# Patient Record
Sex: MALE | Race: WHITE | Employment: FULL TIME | URBAN - METROPOLITAN AREA
[De-identification: names, ages, dates, MRNs, and addresses within clinical notes are randomized per-mention and may not be internally consistent; named-entity substitution may affect disease eponyms.]

---

## 2017-03-23 DIAGNOSIS — N40.1 BENIGN NON-NODULAR PROSTATIC HYPERPLASIA WITH LOWER URINARY TRACT SYMPTOMS: ICD-10-CM

## 2017-03-24 RX ORDER — ALFUZOSIN HYDROCHLORIDE 10 MG/1
TABLET, EXTENDED RELEASE ORAL
Qty: 90 TABLET | Refills: 0 | Status: SHIPPED | OUTPATIENT
Start: 2017-03-24 | End: 2019-12-10 | Stop reason: SDUPTHER

## 2017-05-17 ENCOUNTER — TELEPHONE (OUTPATIENT)
Dept: UROLOGY | Age: 57
End: 2017-05-17

## 2017-05-23 ENCOUNTER — OFFICE VISIT (OUTPATIENT)
Dept: UROLOGY | Age: 57
End: 2017-05-23
Payer: COMMERCIAL

## 2017-05-23 VITALS
HEIGHT: 65 IN | SYSTOLIC BLOOD PRESSURE: 130 MMHG | TEMPERATURE: 98.2 F | BODY MASS INDEX: 26.26 KG/M2 | HEART RATE: 70 BPM | DIASTOLIC BLOOD PRESSURE: 85 MMHG | WEIGHT: 157.6 LBS

## 2017-05-23 DIAGNOSIS — N40.1 BPH WITH OBSTRUCTION/LOWER URINARY TRACT SYMPTOMS: Primary | ICD-10-CM

## 2017-05-23 DIAGNOSIS — N13.8 BPH WITH OBSTRUCTION/LOWER URINARY TRACT SYMPTOMS: Primary | ICD-10-CM

## 2017-05-23 DIAGNOSIS — R35.1 NOCTURIA: ICD-10-CM

## 2017-05-23 DIAGNOSIS — Z12.5 SPECIAL SCREENING FOR MALIGNANT NEOPLASM OF PROSTATE: ICD-10-CM

## 2017-05-23 PROCEDURE — 99214 OFFICE O/P EST MOD 30 MIN: CPT | Performed by: UROLOGY

## 2017-05-23 RX ORDER — ALFUZOSIN HYDROCHLORIDE 10 MG/1
10 TABLET, EXTENDED RELEASE ORAL DAILY
Qty: 90 TABLET | Refills: 3 | Status: SHIPPED | OUTPATIENT
Start: 2017-05-23 | End: 2018-05-11 | Stop reason: SDUPTHER

## 2017-05-23 RX ORDER — CLOTRIMAZOLE 1 %
CREAM (GRAM) TOPICAL
COMMUNITY
Start: 2017-05-08 | End: 2018-05-11 | Stop reason: ALTCHOICE

## 2017-05-23 ASSESSMENT — ENCOUNTER SYMPTOMS
NAUSEA: 0
EYE PAIN: 0
COUGH: 0
EYE REDNESS: 0
ABDOMINAL PAIN: 0
VOMITING: 0
BACK PAIN: 1
SHORTNESS OF BREATH: 0
WHEEZING: 0
COLOR CHANGE: 0

## 2018-05-09 DIAGNOSIS — Z12.5 SPECIAL SCREENING FOR MALIGNANT NEOPLASM OF PROSTATE: ICD-10-CM

## 2018-05-11 ENCOUNTER — OFFICE VISIT (OUTPATIENT)
Dept: UROLOGY | Age: 58
End: 2018-05-11
Payer: COMMERCIAL

## 2018-05-11 VITALS
DIASTOLIC BLOOD PRESSURE: 89 MMHG | WEIGHT: 158.6 LBS | BODY MASS INDEX: 26.42 KG/M2 | HEART RATE: 70 BPM | HEIGHT: 65 IN | TEMPERATURE: 97.8 F | SYSTOLIC BLOOD PRESSURE: 150 MMHG

## 2018-05-11 DIAGNOSIS — N13.8 BPH WITH OBSTRUCTION/LOWER URINARY TRACT SYMPTOMS: ICD-10-CM

## 2018-05-11 DIAGNOSIS — N40.1 BPH WITH OBSTRUCTION/LOWER URINARY TRACT SYMPTOMS: ICD-10-CM

## 2018-05-11 DIAGNOSIS — R35.1 NOCTURIA: Primary | ICD-10-CM

## 2018-05-11 PROCEDURE — 99213 OFFICE O/P EST LOW 20 MIN: CPT | Performed by: UROLOGY

## 2018-05-11 RX ORDER — ASPIRIN 81 MG/1
81 TABLET, CHEWABLE ORAL
COMMUNITY

## 2018-05-11 RX ORDER — FLUTICASONE PROPIONATE 50 MCG
SPRAY, SUSPENSION (ML) NASAL
Refills: 0 | COMMUNITY
Start: 2018-02-21 | End: 2019-12-10 | Stop reason: ALTCHOICE

## 2018-05-11 RX ORDER — ALCOHOL 62 ML/100ML
LIQUID TOPICAL
Refills: 0 | COMMUNITY
Start: 2018-05-01

## 2018-05-11 ASSESSMENT — ENCOUNTER SYMPTOMS
EYE PAIN: 0
SHORTNESS OF BREATH: 0
COLOR CHANGE: 0
BACK PAIN: 0
WHEEZING: 0
VOMITING: 0
NAUSEA: 0
ABDOMINAL PAIN: 0
EYE REDNESS: 0
COUGH: 0

## 2019-11-26 ENCOUNTER — TELEPHONE (OUTPATIENT)
Dept: UROLOGY | Age: 59
End: 2019-11-26

## 2019-12-10 ENCOUNTER — OFFICE VISIT (OUTPATIENT)
Dept: UROLOGY | Age: 59
End: 2019-12-10
Payer: COMMERCIAL

## 2019-12-10 VITALS
DIASTOLIC BLOOD PRESSURE: 86 MMHG | HEART RATE: 64 BPM | WEIGHT: 155.8 LBS | HEIGHT: 65 IN | BODY MASS INDEX: 25.96 KG/M2 | SYSTOLIC BLOOD PRESSURE: 132 MMHG

## 2019-12-10 DIAGNOSIS — N40.1 BENIGN NON-NODULAR PROSTATIC HYPERPLASIA WITH LOWER URINARY TRACT SYMPTOMS: Primary | ICD-10-CM

## 2019-12-10 DIAGNOSIS — Z12.5 PROSTATE CANCER SCREENING: ICD-10-CM

## 2019-12-10 PROCEDURE — 99213 OFFICE O/P EST LOW 20 MIN: CPT | Performed by: UROLOGY

## 2019-12-10 RX ORDER — ALFUZOSIN HYDROCHLORIDE 10 MG/1
10 TABLET, EXTENDED RELEASE ORAL DAILY
Qty: 90 TABLET | Refills: 3 | Status: SHIPPED | OUTPATIENT
Start: 2019-12-10 | End: 2022-03-29 | Stop reason: SDUPTHER

## 2019-12-10 ASSESSMENT — ENCOUNTER SYMPTOMS
BACK PAIN: 0
DIARRHEA: 0
COUGH: 0
VOMITING: 0
ABDOMINAL PAIN: 0
SHORTNESS OF BREATH: 0
WHEEZING: 0
CONSTIPATION: 0
EYE REDNESS: 0
EYE PAIN: 0
NAUSEA: 0

## 2021-02-18 ENCOUNTER — TELEPHONE (OUTPATIENT)
Dept: UROLOGY | Age: 61
End: 2021-02-18

## 2021-02-18 DIAGNOSIS — Z12.5 SCREENING FOR PROSTATE CANCER: Primary | ICD-10-CM

## 2021-03-18 ENCOUNTER — HOSPITAL ENCOUNTER (OUTPATIENT)
Dept: CARDIAC CATH/INVASIVE PROCEDURES | Age: 61
Discharge: HOME | End: 2021-03-18
Payer: COMMERCIAL

## 2021-03-18 VITALS
TEMPERATURE: 98.2 F | OXYGEN SATURATION: 98 % | HEART RATE: 77 BPM | HEIGHT: 66 IN | BODY MASS INDEX: 25.39 KG/M2 | SYSTOLIC BLOOD PRESSURE: 145 MMHG | RESPIRATION RATE: 17 BRPM | WEIGHT: 158 LBS | DIASTOLIC BLOOD PRESSURE: 77 MMHG

## 2021-03-18 LAB
GFR NON-AFRICAN AMERICAN: 56 ML/MIN
GFR SERPL CREATININE-BSD FRML MDRD: >60 ML/MIN
GFR SERPL CREATININE-BSD FRML MDRD: ABNORMAL ML/MIN/{1.73_M2}
GLUCOSE BLD-MCNC: 100 MG/DL (ref 74–100)
POC CHLORIDE: 106 MMOL/L (ref 98–107)
POC CREATININE: 1.29 MG/DL (ref 0.51–1.19)
POC HEMATOCRIT: 45 % (ref 41–53)
POC HEMOGLOBIN: 15.3 G/DL (ref 13.5–17.5)
POC POTASSIUM: 4.6 MMOL/L (ref 3.5–4.5)
POC SODIUM: 138 MMOL/L (ref 138–146)

## 2021-03-18 PROCEDURE — 82947 ASSAY GLUCOSE BLOOD QUANT: CPT

## 2021-03-18 PROCEDURE — 6360000004 HC RX CONTRAST MEDICATION

## 2021-03-18 PROCEDURE — C1725 CATH, TRANSLUMIN NON-LASER: HCPCS

## 2021-03-18 PROCEDURE — 84295 ASSAY OF SERUM SODIUM: CPT

## 2021-03-18 PROCEDURE — 82435 ASSAY OF BLOOD CHLORIDE: CPT

## 2021-03-18 PROCEDURE — C1876 STENT, NON-COA/NON-COV W/DEL: HCPCS

## 2021-03-18 PROCEDURE — 37221 HC ILIAC TERRITORY PLASTY STENT: CPT | Performed by: SURGERY

## 2021-03-18 PROCEDURE — 6360000002 HC RX W HCPCS

## 2021-03-18 PROCEDURE — 7100000000 HC PACU RECOVERY - FIRST 15 MIN

## 2021-03-18 PROCEDURE — 85014 HEMATOCRIT: CPT

## 2021-03-18 PROCEDURE — C1887 CATHETER, GUIDING: HCPCS

## 2021-03-18 PROCEDURE — 82565 ASSAY OF CREATININE: CPT

## 2021-03-18 PROCEDURE — 7100000001 HC PACU RECOVERY - ADDTL 15 MIN

## 2021-03-18 PROCEDURE — 75625 CONTRAST EXAM ABDOMINL AORTA: CPT | Performed by: SURGERY

## 2021-03-18 PROCEDURE — C1769 GUIDE WIRE: HCPCS

## 2021-03-18 PROCEDURE — C1760 CLOSURE DEV, VASC: HCPCS

## 2021-03-18 PROCEDURE — 2500000003 HC RX 250 WO HCPCS

## 2021-03-18 PROCEDURE — C1894 INTRO/SHEATH, NON-LASER: HCPCS

## 2021-03-18 PROCEDURE — 84132 ASSAY OF SERUM POTASSIUM: CPT

## 2021-03-18 PROCEDURE — 2709999900 HC NON-CHARGEABLE SUPPLY

## 2021-03-18 RX ORDER — SODIUM CHLORIDE 9 MG/ML
INJECTION, SOLUTION INTRAVENOUS CONTINUOUS
Status: DISCONTINUED | OUTPATIENT
Start: 2021-03-18 | End: 2021-03-19 | Stop reason: HOSPADM

## 2021-03-18 RX ORDER — ACETAMINOPHEN 325 MG/1
650 TABLET ORAL EVERY 4 HOURS PRN
Status: DISCONTINUED | OUTPATIENT
Start: 2021-03-18 | End: 2021-03-19 | Stop reason: HOSPADM

## 2021-03-18 RX ORDER — SODIUM CHLORIDE 0.9 % (FLUSH) 0.9 %
10 SYRINGE (ML) INJECTION PRN
Status: DISCONTINUED | OUTPATIENT
Start: 2021-03-18 | End: 2021-03-19 | Stop reason: HOSPADM

## 2021-03-18 RX ORDER — SODIUM CHLORIDE 0.9 % (FLUSH) 0.9 %
10 SYRINGE (ML) INJECTION EVERY 12 HOURS SCHEDULED
Status: DISCONTINUED | OUTPATIENT
Start: 2021-03-18 | End: 2021-03-19 | Stop reason: HOSPADM

## 2021-03-18 RX ADMIN — SODIUM CHLORIDE: 9 INJECTION, SOLUTION INTRAVENOUS at 08:44

## 2021-03-18 NOTE — PROGRESS NOTES
All discharge instructions reviewed, questions answered, paper signed and given copy. Patient discharged per wheelchair with spouse and belongings.

## 2021-03-18 NOTE — PROGRESS NOTES
Patient admitted, consent signed and questions answered. Patient ready for procedure. Call light to reach with side rails up 2 of 2. BILATERALgroins clipped. family at bedside with patient. History and physical updated.

## 2021-03-18 NOTE — PROGRESS NOTES
patient ambulated in hallway. Gait steady while up. No hematoma or drainage to left groin after walking.

## 2021-03-18 NOTE — PRE SEDATION
Sedation Pre-Procedure Note    Patient Name: Lore Falcon   YOB: 1960  Room/Bed: Room/bed info not found  Medical Record Number: [id number]  Date: 3/18/2021   Time: 10:44 AM       Indication:  Left iliac occlusion,     Consent: I have discussed with the patient and/or the patient representative the indication, alternatives, and the possible risks and/or complications of the planned procedure and the anesthesia methods. The patient and/or patient representative appear to understand and agree to proceed. Vital Signs:   Vitals:    03/18/21 0839   BP: (!) 142/83   Pulse: 63   Resp: 18   Temp: 98.2 °F (36.8 °C)   SpO2: 99%       Past Medical History:   has a past medical history of Heart attack ([facility] [address] Hyperlipidemia, Hypertension, and Seizures ([facility] [address] Past Surgical History:   has a past surgical history that includes Ventricular ablation surgery (3/23/16); Coronary angioplasty with stent; and hernia repair. Medications:   Scheduled Meds:    sodium chloride flush  10 mL Intravenous 2 times per day     Continuous Infusions:    sodium chloride 75 mL/hr at 03/18/21 0844     PRN Meds: sodium chloride flush  Home Meds:   Prior to Admission medications    Medication Sig Start Date End Date Taking?  Authorizing Provider   alfuzosin (UROXATRAL) 10 MG extended release tablet Take 1 tablet by mouth daily 12/10/19  Yes Ely Lopez MD   aspirin 81 MG chewable tablet Take 81 mg by mouth   Yes Historical Provider, MD MOREIRA LORATADINE 10 MG tablet  5/1/18  Yes Historical Provider, MD   EFFIENT 10 MG TABS  1/12/16  Yes Historical Provider, MD   metoprolol (TOPROL-XL) 50 MG XL tablet Take 1 tablet by mouth daily 3/14/16  Yes Historical Provider, MD   ramipril (ALTACE) 2.5 MG capsule Take 5 mg by mouth daily  1/12/16  Yes Historical Provider, MD   RANEXA 1000 MG SR tablet Take 1 tablet by mouth 2 times daily 2/1/16  Yes Historical Provider, MD   CRESTOR 10 MG tablet Take 1 tablet by mouth daily 3/14/16  Yes

## 2021-03-18 NOTE — BRIEF OP NOTE
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    Post-Op Diagnosis: Same       Procedure:  1)  US guided access left CFA  2)  Aortogram with pelvic runoff  3) left common iliac artery stent       Surgeon:  Luna Weeks MD    Assistant:  Goyo Andrews PGY 3    Anesthesia: Versed 2 mg, fentanyl 50 mcg    Estimated Blood Loss (mL): Minimal    Complications: None    Specimens:   * Cannot find log *    Implants:  * No surgical log found *      Drains: * No LDAs found *    Findings:   1) occlusion of the common iliac artery with reconstitution of the hypogastric and external iliac via collaterals  2) no evidence of stenosis of the renal arteries or SMA.     Electronically signed by Luna Weeks MD on 3/18/2021 at 11:28 AM

## 2021-03-18 NOTE — OP NOTE
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    Post-Op Diagnosis: Same       Procedure:  1)  US guided access left CFA  2)  Aortogram with pelvic runoff  3) left common iliac artery stent       Surgeon:  Luna Weeks MD    Assistant:  Goyo Andrews PGY 3    Anesthesia: Versed 2 mg, fentanyl 50 mcg    Estimated Blood Loss (mL): Minimal    Complications: None    Specimens:   * Cannot find log *    Implants:  * No surgical log found *      Drains: * No LDAs found *    Findings:   1) occlusion of the common iliac artery with reconstitution of the hypogastric and external iliac via collaterals      Detailed Description of Procedure: This is a 77-year-old male with severe left leg claudication. A CTA shows occlusion of his left common iliac artery and presents for revascularization. Patient was brought to the Cath Lab and placed supine the groins were cleaned and prepped and sterile drapes were applied. Ultrasound was used to identify the left common femoral artery. Using a micropuncture needle and catheter access was gained. A retrograde arteriogram showed the occlusion with a patent internal iliac and external iliac arteries. Using a support catheter the lesion was successfully crossed and reentry was gained into the abdominal aorta. This was confirmed with a hand-injection of contrast.  A 6 mm x 4 cm balloon was used to predilate and to measure the lesion. Ultimately the sheath was upsized to a 7 [place] sheath. A balloon expandable stent 9 mm x 37 and a second 9 mm x 17 mm stent were deployed and inline flow was restored. A completion aortogram showed resolution of the lesion. A minx closure was used to close the arteriotomy. The procedure was tolerated well without complication.       Electronically signed by Luna Weeks MD on 3/18/2021 at 11:35

## 2021-03-18 NOTE — POST SEDATION
Sedation Post Procedure Note    Patient Name: Boni Lyon   YOB: 1960  Room/Bed: Room/bed info not found  Medical Record Number: [id number]  Date: 3/18/2021   Time: 11:25 AM         Physicians/Assistants: Jace Clark MD    Procedure Performed:    1)  US guided access left CFA  2)  Aortogram with pelvic runoff  3) left common iliac artery stent       Post-Sedation Vital Signs:  Vitals:    03/18/21 0839   BP: (!) 142/83   Pulse: 63   Resp: 18   Temp: 98.2 °F (36.8 °C)   SpO2: 99%      Vital signs were reviewed and were stable after the procedure (see flow sheet for vitals)            Post-Sedation Exam: Lungs: clear and Cardiovascular: normal           Complications: none    Electronically signed by Jace Clark MD on 3/18/2021 at 11:25 AM

## 2021-03-23 ENCOUNTER — OFFICE VISIT (OUTPATIENT)
Dept: UROLOGY | Age: 61
End: 2021-03-23
Payer: COMMERCIAL

## 2021-03-23 VITALS — SYSTOLIC BLOOD PRESSURE: 128 MMHG | HEART RATE: 75 BPM | DIASTOLIC BLOOD PRESSURE: 78 MMHG | TEMPERATURE: 98.3 F

## 2021-03-23 DIAGNOSIS — Z12.5 SCREENING FOR PROSTATE CANCER: Primary | ICD-10-CM

## 2021-03-23 DIAGNOSIS — N13.8 BPH WITH OBSTRUCTION/LOWER URINARY TRACT SYMPTOMS: ICD-10-CM

## 2021-03-23 DIAGNOSIS — N40.1 BPH WITH OBSTRUCTION/LOWER URINARY TRACT SYMPTOMS: ICD-10-CM

## 2021-03-23 PROCEDURE — 99214 OFFICE O/P EST MOD 30 MIN: CPT | Performed by: UROLOGY

## 2021-03-23 RX ORDER — ALFUZOSIN HYDROCHLORIDE 10 MG/1
10 TABLET, EXTENDED RELEASE ORAL DAILY
Qty: 90 TABLET | Refills: 3 | Status: SHIPPED | OUTPATIENT
Start: 2021-03-23

## 2021-03-23 ASSESSMENT — ENCOUNTER SYMPTOMS
COUGH: 0
CONSTIPATION: 0
NAUSEA: 0
SHORTNESS OF BREATH: 0
VOMITING: 0
EYE PAIN: 0
ABDOMINAL PAIN: 0
EYE REDNESS: 0
WHEEZING: 0
DIARRHEA: 0
BACK PAIN: 0

## 2021-03-23 NOTE — LETTER
[address] [address]  Dept: [phone]  Dept Fax: [phone]        3/23/21    Patient: Nadeem Richard  YOB: 1960    Dear Ernie Vanessa,    I had the pleasure of seeing one of your patients, Marian Gilmore today in the office today. Below are the relevant portions of my assessment and plan of care. IMPRESSION:  1. Screening for prostate cancer    2. BPH with obstruction/lower urinary tract symptoms        PLAN:  He is voiding well  Uroxatral makes a difference. PSA normal.   F/U in 1 year with a PSA. Return in about 1 year (around 3/23/2022) for Labs. Prescriptions Ordered:  Orders Placed This Encounter   Medications    alfuzosin (UROXATRAL) 10 MG extended release tablet     Sig: Take 1 tablet by mouth daily     Dispense:  90 tablet     Refill:  3     Orders Placed:  Orders Placed This Encounter   Procedures    PSA, Diagnostic     Standing Status:   Future     Standing Expiration Date:   3/23/2022        Thank you for allowing me to participate in the care of this patient. I will keep you updated on this patient's follow up and I look forward to serving you and your patients again in the future.         Lisa Wright MD

## 2021-03-23 NOTE — PROGRESS NOTES
[address] [address]  Dept: [address] [facility] Urology Office Note - Established    Patient:  Jose Cruz Reilly  YOB: 1960  Date: 3/23/2021    The patient is a 56 y.o. male who presents todayfor evaluation of the following problems:   Chief Complaint   Patient presents with    1 Year Follow Up     with PSA       HPI  Here for one year follow up BPH. He continues Uroxatral daily, he has nocturia 2-3x but does not restrict HS fluids. He has no dyuria, no hematuria. no stone Hx, Dad has Hx of kidney cancer, no Hx of bladder or prostate cacner. He recently quit smoking DT blockage of the lower LT leg circulation with stent placement. His most recent PSA 2/21- 1.38. Summary of old records: N/A    Additional History: N/A    Procedures Today: N/A    Urinalysis today:  No results found for this visit on 03/23/21. Last several PSA's:  No results found for: PSA  Last total testosterone:  No results found for: TESTOSTERONE    AUA Symptom Score (3/23/2021):   INCOMPLETE EMPTYING: How often have you had the sensation of not emptying your bladder?: Not at all  FREQUENCY: How often do you have to urinate less than every two hours?: Less than Half the time  INTERMITTENCY: How often have you found you stopped and started again several times when you urinated?: Not at all  URGENCY: How often have you found it difficult to postpone urination?: Not at all  WEAK STREAM: How often have you had a weak urinary stream?: Not at all  STRAINING: How often have you had to strain to start  urination?: Not at all  NOCTURIA: How many times did you typically get up at night to uriniate?: 2 Times  TOTAL I-PSS SCORE[de-identified] 4  How would you feel if you were to spend the rest of your life with your urinary condition?: Mixe    Last BUN and creatinine:  Lab Results   Component Value Date    BUN 13 01/06/2013     Lab Results   Component Value Date    CREATININE 1.29 (H) 03/18/2021       Additional Lab/Culture results: none    Imaging Reviewed during this Office Visit: none  (results were independently reviewed by physician and radiology report verified)    PAST MEDICAL, FAMILY AND SOCIAL HISTORY UPDATE:  Past Medical History:   Diagnosis Date    [facility] [address]     Hyperlipidemia     Hypertension     Seizures ([facility] [address]      Past Surgical History:   Procedure Laterality Date    CORONARY ANGIOPLASTY WITH STENT PLACEMENT      x2    HERNIA REPAIR      VENTRICULAR ABLATION SURGERY  3/23/16     Family History   Problem Relation Age of Onset    Stroke Mother     Cancer Father         bone and kidney     Heart Disease Father     Cancer Brother         bone    Heart Disease Brother      Outpatient Medications Marked as Taking for the 3/23/21 encounter (Office Visit) with Shanta Brody MD   Medication Sig Dispense Refill    alfuzosin (UROXATRAL) 10 MG extended release tablet Take 1 tablet by mouth daily 90 tablet 3    alfuzosin (UROXATRAL) 10 MG extended release tablet Take 1 tablet by mouth daily 90 tablet 3    aspirin 81 MG chewable tablet Take 81 mg by mouth      RA LORATADINE 10 MG tablet   0    EFFIENT 10 MG TABS       metoprolol (TOPROL-XL) 50 MG XL tablet Take 1 tablet by mouth daily      nitroGLYCERIN (NITRODUR) 0.2 MG/HR Place onto the skin as needed      NITROSTAT 0.4 MG SL tablet Take 1 tablet by mouth as needed  0    ramipril (ALTACE) 2.5 MG capsule Take 5 mg by mouth daily       RANEXA 1000 MG SR tablet Take 1 tablet by mouth 2 times daily      CRESTOR 10 MG tablet Take 1 tablet by mouth daily         Patient has no known allergies.   Social History     Tobacco Use   Smoking Status Current Every Day Smoker   Smokeless Tobacco Never Used     (Ifpatient a smoker, smoking cessation counseling offered)    Social History     Substance and Sexual Activity   Alcohol Use Yes    Comment: occasional        REVIEW OF SYSTEMS: Review of Systems    Physical Exam:      Vitals:    03/23/21 1318   BP: 128/78   Pulse: 75   Temp: 98.3 °F (36.8 °C)     There is no height or weight on file to calculate BMI. Patient is a 56 y.o. male in no acute distress and alert and oriented to person, place and time. Physical Exam  Constitutional: Patient in no acute distress. Neuro: Alert and oriented to person, place and time. Psych: Mood normal, affect normal  Lungs: Respiratory effort is normal  Cardiovascular: Warm & Pink  Abdomen: Soft, non-tender, non-distended with no CVA,  No flank tenderness,  Or hepatosplenomegaly   Lymphatics: No palpablelymphadenopathy. Bladder non-tender and not distended. Musculoskeletal: Normal gait and station  Testiscles: Normal, bilaterally  Prostate:  60 grams, L>R    Assessment and Plan      1. Screening for prostate cancer    2. BPH with obstruction/lower urinary tract symptoms           Plan:          He is voiding well  Uroxatral makes a difference. PSA normal.   F/U in 1 year with a PSA. Return in about 1 year (around 3/23/2022) for Labs. Prescriptions Ordered:  Orders Placed This Encounter   Medications    alfuzosin (UROXATRAL) 10 MG extended release tablet     Sig: Take 1 tablet by mouth daily     Dispense:  90 tablet     Refill:  3     Orders Placed:  Orders Placed This Encounter   Procedures    PSA, Diagnostic     Standing Status:   Future     Standing Expiration Date:   3/23/2022           Anais Hanna MD    Agree with the ROS entered by the MA.

## 2021-05-18 ENCOUNTER — APPOINTMENT (OUTPATIENT)
Dept: GENERAL RADIOLOGY | Age: 61
End: 2021-05-18
Payer: COMMERCIAL

## 2021-05-18 ENCOUNTER — HOSPITAL ENCOUNTER (EMERGENCY)
Age: 61
Discharge: HOME | End: 2021-05-18
Attending: EMERGENCY MEDICINE
Payer: COMMERCIAL

## 2021-05-18 VITALS
BODY MASS INDEX: 26.84 KG/M2 | OXYGEN SATURATION: 97 % | DIASTOLIC BLOOD PRESSURE: 79 MMHG | HEART RATE: 60 BPM | SYSTOLIC BLOOD PRESSURE: 126 MMHG | WEIGHT: 167 LBS | HEIGHT: 66 IN | RESPIRATION RATE: 13 BRPM | TEMPERATURE: 97.5 F

## 2021-05-18 DIAGNOSIS — R42 LIGHT HEADEDNESS: ICD-10-CM

## 2021-05-18 DIAGNOSIS — I95.9 HYPOTENSION, UNSPECIFIED HYPOTENSION TYPE: Primary | ICD-10-CM

## 2021-05-18 LAB
ABSOLUTE EOS #: 0.25 K/UL (ref 0–0.44)
ABSOLUTE IMMATURE GRANULOCYTE: 0.03 K/UL (ref 0–0.3)
ABSOLUTE LYMPH #: 3.66 K/UL (ref 1.1–3.7)
ABSOLUTE MONO #: 0.84 K/UL (ref 0.1–1.2)
ALBUMIN SERPL-MCNC: 4 G/DL (ref 3.5–5.2)
ALBUMIN/GLOBULIN RATIO: 1.5 (ref 1–2.5)
ALP BLD-CCNC: 72 U/L (ref 40–129)
ALT SERPL-CCNC: 8 U/L (ref 5–41)
ANION GAP SERPL CALCULATED.3IONS-SCNC: 11 MMOL/L (ref 9–17)
AST SERPL-CCNC: 16 U/L
BASOPHILS # BLD: 1 % (ref 0–2)
BASOPHILS ABSOLUTE: 0.05 K/UL (ref 0–0.2)
BILIRUB SERPL-MCNC: 0.2 MG/DL (ref 0.3–1.2)
BUN BLDV-MCNC: 15 MG/DL (ref 8–23)
BUN/CREAT BLD: ABNORMAL (ref 9–20)
CALCIUM SERPL-MCNC: 9 MG/DL (ref 8.6–10.4)
CHLORIDE BLD-SCNC: 103 MMOL/L (ref 98–107)
CO2: 20 MMOL/L (ref 20–31)
CREAT SERPL-MCNC: 0.93 MG/DL (ref 0.7–1.2)
DIFFERENTIAL TYPE: NORMAL
EOSINOPHILS RELATIVE PERCENT: 2 % (ref 1–4)
GFR AFRICAN AMERICAN: >60 ML/MIN
GFR NON-AFRICAN AMERICAN: >60 ML/MIN
GFR SERPL CREATININE-BSD FRML MDRD: ABNORMAL ML/MIN/{1.73_M2}
GFR SERPL CREATININE-BSD FRML MDRD: ABNORMAL ML/MIN/{1.73_M2}
GLUCOSE BLD-MCNC: 94 MG/DL (ref 70–99)
HCT VFR BLD CALC: 42.8 % (ref 40.7–50.3)
HEMOGLOBIN: 14.4 G/DL (ref 13–17)
IMMATURE GRANULOCYTES: 0 %
INR BLD: 1
LYMPHOCYTES # BLD: 36 % (ref 24–43)
MCH RBC QN AUTO: 33.1 PG (ref 25.2–33.5)
MCHC RBC AUTO-ENTMCNC: 33.6 G/DL (ref 28.4–34.8)
MCV RBC AUTO: 98.4 FL (ref 82.6–102.9)
MONOCYTES # BLD: 8 % (ref 3–12)
NRBC AUTOMATED: 0 PER 100 WBC
PARTIAL THROMBOPLASTIN TIME: 24 SEC (ref 20.5–30.5)
PDW BLD-RTO: 13.2 % (ref 11.8–14.4)
PLATELET # BLD: 244 K/UL (ref 138–453)
PLATELET ESTIMATE: NORMAL
PMV BLD AUTO: 10.2 FL (ref 8.1–13.5)
POTASSIUM SERPL-SCNC: 4.5 MMOL/L (ref 3.7–5.3)
PROTHROMBIN TIME: 10.4 SEC (ref 9.1–12.3)
RBC # BLD: 4.35 M/UL (ref 4.21–5.77)
RBC # BLD: NORMAL 10*6/UL
SEG NEUTROPHILS: 53 % (ref 36–65)
SEGMENTED NEUTROPHILS ABSOLUTE COUNT: 5.42 K/UL (ref 1.5–8.1)
SODIUM BLD-SCNC: 134 MMOL/L (ref 135–144)
TOTAL PROTEIN: 6.7 G/DL (ref 6.4–8.3)
TROPONIN INTERP: NORMAL
TROPONIN INTERP: NORMAL
TROPONIN T: NORMAL NG/ML
TROPONIN T: NORMAL NG/ML
TROPONIN, HIGH SENSITIVITY: 8 NG/L (ref 0–22)
TROPONIN, HIGH SENSITIVITY: 9 NG/L (ref 0–22)
WBC # BLD: 10.3 K/UL (ref 3.5–11.3)
WBC # BLD: NORMAL 10*3/UL

## 2021-05-18 PROCEDURE — 93005 ELECTROCARDIOGRAM TRACING: CPT | Performed by: GENERAL PRACTICE

## 2021-05-18 PROCEDURE — 80053 COMPREHEN METABOLIC PANEL: CPT

## 2021-05-18 PROCEDURE — 99284 EMERGENCY DEPT VISIT MOD MDM: CPT

## 2021-05-18 PROCEDURE — 84484 ASSAY OF TROPONIN QUANT: CPT

## 2021-05-18 PROCEDURE — 85730 THROMBOPLASTIN TIME PARTIAL: CPT

## 2021-05-18 PROCEDURE — 85610 PROTHROMBIN TIME: CPT

## 2021-05-18 PROCEDURE — 71045 X-RAY EXAM CHEST 1 VIEW: CPT

## 2021-05-18 PROCEDURE — 85025 COMPLETE CBC W/AUTO DIFF WBC: CPT

## 2021-05-18 PROCEDURE — 2580000003 HC RX 258: Performed by: GENERAL PRACTICE

## 2021-05-18 RX ORDER — ONDANSETRON 2 MG/ML
4 INJECTION INTRAMUSCULAR; INTRAVENOUS ONCE
Status: DISCONTINUED | OUTPATIENT
Start: 2021-05-18 | End: 2021-05-19 | Stop reason: HOSPADM

## 2021-05-18 RX ORDER — 0.9 % SODIUM CHLORIDE 0.9 %
1000 INTRAVENOUS SOLUTION INTRAVENOUS ONCE
Status: COMPLETED | OUTPATIENT
Start: 2021-05-18 | End: 2021-05-18

## 2021-05-18 RX ADMIN — SODIUM CHLORIDE 1000 ML: 9 INJECTION, SOLUTION INTRAVENOUS at 22:05

## 2021-05-19 LAB
EKG ATRIAL RATE: 57 BPM
EKG P AXIS: 61 DEGREES
EKG P-R INTERVAL: 154 MS
EKG Q-T INTERVAL: 460 MS
EKG QRS DURATION: 148 MS
EKG QTC CALCULATION (BAZETT): 447 MS
EKG R AXIS: 86 DEGREES
EKG T AXIS: 34 DEGREES
EKG VENTRICULAR RATE: 57 BPM

## 2021-05-19 PROCEDURE — 93010 ELECTROCARDIOGRAM REPORT: CPT | Performed by: INTERNAL MEDICINE

## 2021-05-19 ASSESSMENT — ENCOUNTER SYMPTOMS
EYES NEGATIVE: 1
VOMITING: 0
NAUSEA: 0
PHOTOPHOBIA: 0
SHORTNESS OF BREATH: 0
ABDOMINAL PAIN: 0

## 2021-05-19 NOTE — ED PROVIDER NOTES
[address] ED  Emergency Department Encounter  EmergencyMedicine Resident     Pt Rell Townsend  MRN: [id number]  Kathygfcory 1960  Date of evaluation: 5/18/21  PCP:  Marco Antonio [address]       Chief Complaint   Patient presents with    Hypotension     at home b/p systolic in the 93W, \" just doesn't feel good\"        HISTORY OF PRESENT ILLNESS  (Location/Symptom, Timing/Onset, Context/Setting, Quality, Duration, Modifying Factors, Severity.)      Marge Morales is a 56 y.o. male who presents with episode of lightheadedness and \"just not feeling well\" at home. Patient took his blood pressure and stated that the blood pressure cuff read with a systolic in the 16M. On arrival here his systolic blood pressure is 130. He denies chest pain or shortness of breath. He does have a CAD history. Is on dual antiplatelet. PAST MEDICAL / SURGICAL / SOCIAL / FAMILY HISTORY      has a past medical history of Heart attack ([facility] [address] Hyperlipidemia, Hypertension, and Seizures ([facility] [address] Denies further past medical hx     has a past surgical history that includes Ventricular ablation surgery (3/23/16); Coronary angioplasty with stent; and hernia repair.   Denies further past surgical hx    Social History     Socioeconomic History    Marital status:      Spouse name: Not on file    Number of children: Not on file    Years of education: Not on file    Highest education level: Not on file   Occupational History    Not on file   Tobacco Use    Smoking status: Current Every Day Smoker    Smokeless tobacco: Never Used   Vaping Use    Vaping Use: Never used   Substance and Sexual Activity    Alcohol use: Yes     Comment: occasional     Drug use: No    Sexual activity: Yes     Partners: Female   Other Topics Concern    Not on file   Social History Narrative    Not on file     Social Determinants of Health     Financial Resource Strain:     Difficulty of Paying Living Expenses: Food Insecurity:     Worried About Running Out of Food in the Last Year:     [address] in the Last Year:    Transportation Needs:     Lack of Transportation (Medical):  Lack of Transportation (Non-Medical):    Physical Activity:     Days of Exercise per Week:     Minutes of Exercise per Session:    Stress:     Feeling of Stress :    Social Connections:     Frequency of Communication with Friends and Family:     Frequency of Social Gatherings with Friends and Family:     Attends Alevism Services:     Active Member of Clubs or Organizations:     Attends Club or Organization Meetings:     Marital Status:    Intimate Partner Violence:     Fear of Current or Ex-Partner:     Emotionally Abused:     Physically Abused:     Sexually Abused:        Family History   Problem Relation Age of Onset    Stroke Mother     Cancer Father         bone and kidney     Heart Disease Father     Cancer Brother         bone    Heart Disease Brother        Allergies:  Patient has no known allergies. Home Medications:  Prior to Admission medications    Medication Sig Start Date End Date Taking?  Authorizing Provider   alfuzosin (UROXATRAL) 10 MG extended release tablet Take 1 tablet by mouth daily 3/23/21   Cesar Olivas MD   alfuzosin (UROXATRAL) 10 MG extended release tablet Take 1 tablet by mouth daily 12/10/19   Cesar Olivas MD   aspirin 81 MG chewable tablet Take 81 mg by mouth    Historical Provider, MD MOREIRA LORATADINE 10 MG tablet  5/1/18   Historical Provider, MD   EFFIENT 10 MG TABS  1/12/16   Historical Provider, MD   metoprolol (TOPROL-XL) 50 MG XL tablet Take 1 tablet by mouth daily 3/14/16   Historical Provider, MD   nitroGLYCERIN (NITRODUR) 0.2 MG/HR Place onto the skin as needed 1/19/16   Historical Provider, MD   NITROSTAT 0.4 MG SL tablet Take 1 tablet by mouth as needed 1/19/16   Historical Provider, MD   ramipril (ALTACE) 2.5 MG capsule Take 5 mg by mouth daily  1/12/16 Historical Provider, MD   RANEXA 1000 MG SR tablet Take 1 tablet by mouth 2 times daily 2/1/16   Historical Provider, MD   CRESTOR 10 MG tablet Take 1 tablet by mouth daily 3/14/16   Historical Provider, MD       REVIEW OF SYSTEMS    (2-9 systems for level 4, 10 or more for level 5)      Review of Systems   Constitutional: Negative for chills and fever. HENT: Negative. Eyes: Negative. Negative for photophobia. Respiratory: Negative for shortness of breath. Cardiovascular: Negative for chest pain. Gastrointestinal: Negative for abdominal pain, nausea and vomiting. Endocrine: Negative. Genitourinary: Negative. Musculoskeletal: Negative. Neurological: Positive for light-headedness. PHYSICAL EXAM   (up to 7 for level 4, 8 or more for level 5)      INITIAL VITALS:   /79   Pulse 60   Temp 97.5 °F (36.4 °C) (Oral)   Resp 13   Ht 5' 6\" (1.676 m)   Wt 167 lb (75.8 kg)   SpO2 97%   BMI 26.95 kg/m²     Physical Exam   Gen. Appearance: patient appears well, nondistressed. Head: head atraumatic, normocephalic. Eyes: Extraocular movements intact. No scleral icterus  Mouth: Oropharynx clear and moist.  No oral lesions  Neck: Supple. No lymphadenopathy. Pulmonary: Lungs clear to auscultation bilaterally. No wheezing, rales or rhonchi   Cardiovascular: Regular rate and rhythm, no murmurs   Abdomen: Soft, nontender, no guarding or rebound, normal bowel sounds  Neurology: GCS 15. Oriented to person place and time.   moving all extremities   Skin: Warm, dry, well perfused        DIFFERENTIAL  DIAGNOSIS     PLAN (LABS / IMAGING / EKG):  Orders Placed This Encounter   Procedures    XR CHEST PORTABLE    CBC Auto Differential    Protime-INR    APTT    Troponin    Comprehensive Metabolic Panel w/ Reflex to MG    Troponin    Vital signs    EKG 12 Lead    EKG 12 Lead       MEDICATIONS ORDERED:  Orders Placed This Encounter   Medications    0.9 % sodium chloride bolus    DISCONTD: ondansetron (ZOFRAN) injection 4 mg           DIAGNOSTIC RESULTS / EMERGENCY DEPARTMENT COURSE / MDM     LABS:  Results for orders placed or performed during the hospital encounter of 05/18/21   CBC Auto Differential   Result Value Ref Range    WBC 10.3 3.5 - 11.3 k/uL    RBC 4.35 4.21 - 5.77 m/uL    Hemoglobin 14.4 13.0 - 17.0 g/dL    Hematocrit 42.8 40.7 - 50.3 %    MCV 98.4 82.6 - 102.9 fL    MCH 33.1 25.2 - 33.5 pg    MCHC 33.6 28.4 - 34.8 g/dL    RDW 13.2 11.8 - 14.4 %    Platelets 536 989 - 491 k/uL    MPV 10.2 8.1 - 13.5 fL    NRBC Automated 0.0 0.0 per 100 WBC    Differential Type NOT REPORTED     Seg Neutrophils 53 36 - 65 %    Lymphocytes 36 24 - 43 %    Monocytes 8 3 - 12 %    Eosinophils % 2 1 - 4 %    Basophils 1 0 - 2 %    Immature Granulocytes 0 0 %    Segs Absolute 5.42 1.50 - 8.10 k/uL    Absolute Lymph # 3.66 1.10 - 3.70 k/uL    Absolute Mono # 0.84 0.10 - 1.20 k/uL    Absolute Eos # 0.25 0.00 - 0.44 k/uL    Basophils Absolute 0.05 0.00 - 0.20 k/uL    Absolute Immature Granulocyte 0.03 0.00 - 0.30 k/uL    WBC Morphology NOT REPORTED     RBC Morphology NOT REPORTED     Platelet Estimate NOT REPORTED    Protime-INR   Result Value Ref Range    Protime 10.4 9.1 - 12.3 sec    INR 1.0    APTT   Result Value Ref Range    PTT 24.0 20.5 - 30.5 sec   Troponin   Result Value Ref Range    Troponin, High Sensitivity 9 0 - 22 ng/L    Troponin T NOT REPORTED <0.03 ng/mL    Troponin Interp NOT REPORTED    Comprehensive Metabolic Panel w/ Reflex to MG   Result Value Ref Range    Glucose 94 70 - 99 mg/dL    BUN 15 8 - 23 mg/dL    CREATININE 0.93 0.70 - 1.20 mg/dL    Bun/Cre Ratio NOT REPORTED 9 - 20    Calcium 9.0 8.6 - 10.4 mg/dL    Sodium 134 (L) 135 - 144 mmol/L    Potassium 4.5 3.7 - 5.3 mmol/L    Chloride 103 98 - 107 mmol/L    CO2 20 20 - 31 mmol/L    Anion Gap 11 9 - 17 mmol/L    Alkaline Phosphatase 72 40 - 129 U/L    ALT 8 5 - 41 U/L    AST 16 <40 U/L    Total Bilirubin 0.20 (L) 0.3 - 1.2 mg/dL Total Protein 6.7 6.4 - 8.3 g/dL    Albumin 4.0 3.5 - 5.2 g/dL    Albumin/Globulin Ratio 1.5 1.0 - 2.5    GFR Non-African American >60 >60 mL/min    GFR African American >60 >60 mL/min    GFR Comment          GFR Staging NOT REPORTED    Troponin   Result Value Ref Range    Troponin, High Sensitivity 8 0 - 22 ng/L    Troponin T NOT REPORTED <0.03 ng/mL    Troponin Interp NOT REPORTED        RADIOLOGY:  None    EKG  Regular rate, regular rhythm, right bundle branch block with T wave inversions in V1 and V3, and aVF. ST segment elevation in 3, aVF, and depression in aVL. Impression: Right bundle branch block    All EKG's are interpreted by the Emergency Department Physician who either signs or Co-signs this chart in the absence of a cardiologist.    Ulysess Heading MDM:  56 y.o. male who presents with episode of hypotension at home and lightheadedness. Cardiac work-up here is unremarkable. Systolic blood pressure greater than 120. He is afebrile, pulse of 60. Asymptomatic at the time of interview. Denies chest pain or shortness of breath any point during his day. Recommend patient follow-up with his primary care provider and his cardiologist with whom he has good follow-up. PROCEDURES:  None    CONSULTS:  None    CRITICAL CARE:  None    FINAL IMPRESSION      1. Hypotension, unspecified hypotension type    2. Light headedness              DISPOSITION / PLAN     DISPOSITION Decision To Discharge 05/18/2021 10:45:05 PM      PATIENT REFERRED TO:  No follow-up provider specified.     DISCHARGE MEDICATIONS:  Discharge Medication List as of 5/18/2021 10:45 PM          Joseph Flor DO  Emergency Medicine Resident    (Please note that portions of thisnote were completed with a voice recognition program.  Efforts were made to edit the dictations but occasionally words are mis-transcribed.)        Joseph Flor DO  Resident  05/19/21 [id number]

## 2021-05-19 NOTE — ED PROVIDER NOTES
[facility]  Emergency Department  Faculty Attestation     I performed a history and physical examination of the patient and discussed management with the resident. I reviewed the residents note and agree with the documented findings and plan of care. Any areas of disagreement are noted on the chart. I was personally present for the key portions of any procedures. I have documented in the chart those procedures where I was not present during the key portions. I have reviewed the emergency nurses triage note. I agree with the chief complaint, past medical history, past surgical history, allergies, medications, social and family history as documented unless otherwise noted below. For Physician Assistant/ Nurse Practitioner cases/documentation I have personally evaluated this patient and have completed at least one if not all key elements of the E/M (history, physical exam, and MDM). Additional findings are as noted. Primary Care Physician:  Terrence Lyn    Screenings:  [unfilled]    CHIEF COMPLAINT       Chief Complaint   Patient presents with    Hypotension     at home b/p systolic in the 95X, \" just doesn't feel good\"      Unfortunately, due to boarding patients in the ED, patient had to wait in the waiting room for 2 hours prior to coming back to a bed. EKG was performed and appears to be right bundle branch block pattern, no old ones to review, unlikely STEMI. RECENT VITALS:   Temp: 97.5 °F (36.4 °C),  Pulse: 76, Resp: 12, BP: 131/80    LABS:  Labs Reviewed - No data to display    Radiology  No orders to display         EKG:   EKG Interpretation    Interpreted by me    Rhythm: normal sinus   Rate: normal, HR 61  Axis: normal  Ectopy: none  Conduction: Right bundle branch block pattern  ST Segments: Elevation in III, F, depression in aVL  T Waves:  Inversion 3, F, V3  Q Waves: none    Clinical Impression: Right bundle branch block pattern    Attending Physician Additional  Notes    Patient felt weak earlier today and took his blood pressure which was 80 systolic. Since then he feels a bit better but just not right. He denies headache chest pain abdominal pain. No vomiting or diarrhea. No thirst.  No cough sputum hemoptysis fever chills sweats myalgias loss of smell or taste or Covid symptoms. Has had both vaccines. No palpitations recently. He has a history of ventricular dysrhythmia and required ablation. Has had prior MI as well as peripheral vascular disease with stent. He is compliant with his medications. No strokelike symptoms. No bleeding in his stools. On exam he has normal vital signs and appears nontoxic. He has either sudden burn or plethora to the chest and face but no true periorbital edema. Lungs are clear. No obvious JVD. Abdomen is benign. No edema cords Homans calf tenderness. GCS is 15. Normal speech and mentation motor strength. Normal gait. EKG shows right bundle branch block. No old EKG available to review. Plan is IV access, labs, consider bedside ultrasound of aorta, troponins, reassess. Elsy Renteria.  Adolfo Mason MD, [facility]  Attending Emergency  Physician               Last Black MD  05/18/21 1528       Last Black MD  05/18/21 3594

## 2021-05-20 LAB
EKG ATRIAL RATE: 61 BPM
EKG P AXIS: 64 DEGREES
EKG P-R INTERVAL: 154 MS
EKG Q-T INTERVAL: 420 MS
EKG QRS DURATION: 142 MS
EKG QTC CALCULATION (BAZETT): 422 MS
EKG R AXIS: 89 DEGREES
EKG T AXIS: 28 DEGREES
EKG VENTRICULAR RATE: 61 BPM

## 2021-05-20 PROCEDURE — 93010 ELECTROCARDIOGRAM REPORT: CPT | Performed by: INTERNAL MEDICINE

## 2021-10-04 ENCOUNTER — HOSPITAL ENCOUNTER (OUTPATIENT)
Dept: PREADMISSION TESTING | Age: 61
Discharge: HOME | End: 2021-10-08
Payer: COMMERCIAL

## 2021-10-04 VITALS — WEIGHT: 160 LBS | BODY MASS INDEX: 25.71 KG/M2 | HEIGHT: 66 IN

## 2021-10-04 NOTE — PROGRESS NOTES
León Chen was not aware that this procedure was scheduled and doesn't know who Dr Jagjit Wynn is, he asked if this is something that Dr Gabriel Vallecillo would have set up? Patient was informed that cardiac clearance was obtained and that Xarelto can be stopped 1-2 days prior to procedure and that cardiologist wants vascular to decide when to stop the effient. León Chen states that he had this set up a few months ago but was having cardiac issues and he states that he is still having cardiac issues and he will call his cardiologist to make sure it is ok to proceed with this procedure. Dr Yoni Fields office number was given to patient.

## 2021-10-04 NOTE — PROGRESS NOTES
Pre-op Instructions For Out-Patient Surgery    Medication Instructions:  · Please stop herbs and any supplements now (includes vitamins and minerals). · Please contact your surgeon and prescribing physician for pre-op instructions for any blood thinners. Xarelto, metoprolol, ranexa as directed. · If you have inhalers/aerosol treatments at home, please use them the morning of your surgery and bring the inhalers with you to the hospital.    · Please take the following medications the morning of your surgery with a sip of water:    Ramipril, metoprolol, Ranexa    Surgery Instructions:  1. After midnight before surgery:  Do not eat or drink anything, including water, mints, gum, and hard candy. You may brush your teeth without swallowing. No smoking, chewing tobacco, or street drugs. 2. Please shower or bathe before surgery. If you were given Surgical Scrub Chlorhexidine Gluconate Liquid (CHG), please shower the night before and the morning of your surgery following the detailed instructions you received during your pre-admission visit. 3. Please do not wear any cologne, lotion, powder, deodorant, jewelry, piercings, perfume, makeup, nail polish, hair accessories, or hair spray on the day of surgery. Wear loose comfortable clothing. 4. Leave your valuables at home. Bring a storage case for any glasses/contacts. 5. An adult who is responsible for you MUST drive you home and should be with you for the first 24 hours after surgery. 6. If having out-patient knee and foot surgeries, please arrange for planned crutches, walker, or wheelchair before arriving to the hospital.    The Day of Surgery:  · Arrive at [facility] AT [facility] Surgery Entrance at the time directed by your surgeon and check in at the desk. · If you have a living will or healthcare power of , please bring a copy.     · You will be taken to the pre-op holding area where you will be prepared for surgery. A physical assessment will be performed by a nurse practitioner or house officer. Your IV will be started and you will meet your anesthesiologist.    · When you go to surgery, your family will be directed to the surgical waiting room, where the doctor should speak with them after your surgery. · After surgery, you will be taken to the recovery room then when you are awake and stable you will go to the short stay unit for preparation to be discharged. Only your one designated person is allowed to come to short stay for your discharge. · If you use a Bi-PAP or C-PAP machine, please bring it with you and leave it in the car in case it is needed in recovery room.

## 2021-10-11 ENCOUNTER — TELEPHONE (OUTPATIENT)
Dept: GASTROENTEROLOGY | Age: 61
End: 2021-10-11

## 2021-10-11 NOTE — TELEPHONE ENCOUNTER
Wife called to see when the patient had his last colonoscopy and advised that I do not see that we have ever seen him. Writer thanked and call ended.

## 2022-03-29 ENCOUNTER — OFFICE VISIT (OUTPATIENT)
Dept: UROLOGY | Age: 62
End: 2022-03-29
Payer: COMMERCIAL

## 2022-03-29 VITALS
SYSTOLIC BLOOD PRESSURE: 132 MMHG | TEMPERATURE: 97.2 F | BODY MASS INDEX: 25.71 KG/M2 | DIASTOLIC BLOOD PRESSURE: 76 MMHG | OXYGEN SATURATION: 98 % | HEIGHT: 66 IN | WEIGHT: 160 LBS | HEART RATE: 68 BPM

## 2022-03-29 DIAGNOSIS — Z12.5 SCREENING FOR PROSTATE CANCER: Primary | ICD-10-CM

## 2022-03-29 DIAGNOSIS — N40.1 BPH WITH OBSTRUCTION/LOWER URINARY TRACT SYMPTOMS: ICD-10-CM

## 2022-03-29 DIAGNOSIS — N13.8 BPH WITH OBSTRUCTION/LOWER URINARY TRACT SYMPTOMS: ICD-10-CM

## 2022-03-29 PROCEDURE — 99214 OFFICE O/P EST MOD 30 MIN: CPT | Performed by: UROLOGY

## 2022-03-29 RX ORDER — METOPROLOL TARTRATE 50 MG/1
TABLET, FILM COATED ORAL
COMMUNITY
Start: 2022-01-06

## 2022-03-29 RX ORDER — ALFUZOSIN HYDROCHLORIDE 10 MG/1
10 TABLET, EXTENDED RELEASE ORAL DAILY
Qty: 90 TABLET | Refills: 3 | Status: SHIPPED | OUTPATIENT
Start: 2022-03-29

## 2022-03-29 RX ORDER — RAMIPRIL 5 MG/1
CAPSULE ORAL
COMMUNITY
Start: 2022-02-25

## 2022-03-29 RX ORDER — ALFUZOSIN HYDROCHLORIDE 10 MG/1
10 TABLET, EXTENDED RELEASE ORAL DAILY
Qty: 90 TABLET | Refills: 3 | Status: CANCELLED | OUTPATIENT
Start: 2022-03-29

## 2022-03-29 RX ORDER — CEFPROZIL 500 MG/1
TABLET, FILM COATED ORAL
COMMUNITY
Start: 2022-03-25

## 2022-03-29 ASSESSMENT — ENCOUNTER SYMPTOMS
COUGH: 0
EYE PAIN: 0
EYE REDNESS: 0
NAUSEA: 0
ABDOMINAL PAIN: 0
BACK PAIN: 0
SHORTNESS OF BREATH: 0
WHEEZING: 0
VOMITING: 0
DIARRHEA: 0
CONSTIPATION: 0

## 2022-03-29 NOTE — LETTER
[address] [address]  Dept: [phone]  Dept Fax: [phone]        3/29/22    Patient: Tan Blanchard  YOB: 1960    Dear Melissa Zavala,    I had the pleasure of seeing one of your patients, Stanislav Yancey today in the office today. Below are the relevant portions of my assessment and plan of care. IMPRESSION:  1. Screening for prostate cancer    2. BPH with obstruction/lower urinary tract symptoms        PLAN:  Doing well. Continue Uroxatral.   PSA normal.   CARON was normal last year. Return in about 1 year (around 3/29/2023) for Labs. Prescriptions Ordered:  Orders Placed This Encounter   Medications    alfuzosin (UROXATRAL) 10 MG extended release tablet     Sig: Take 1 tablet by mouth daily     Dispense:  90 tablet     Refill:  3     Orders Placed:  Orders Placed This Encounter   Procedures    PSA, Diagnostic     Standing Status:   Future     Standing Expiration Date:   3/29/2023        Thank you for allowing me to participate in the care of this patient. I will keep you updated on this patient's follow up and I look forward to serving you and your patients again in the future.         Neha Epps MD

## 2022-03-29 NOTE — PROGRESS NOTES
[address] [facility] [address]  Dept: [address] [facility] Urology Office Note - Established    Patient:  Halley Pope  YOB: 1960  Date: 3/29/2022    The patient is a 59 y.o. male who presents todayfor evaluation of the following problems:   Chief Complaint   Patient presents with    1 Year Follow Up    Results     PSA(Care Everywhere)       HPI  He is here in follow up for BPH and prostate cancer screening. He continues on uroxatral, which does work well for him. PSa has come down nicely to 1.31. Summary of old records: N/A    Additional History: N/A    Procedures Today: N/A    Urinalysis today:  No results found for this visit on 03/29/22. Last several PSA's:  No results found for: PSA  Last total testosterone:  No results found for: TESTOSTERONE    AUA Symptom Score (3/29/2022):   INCOMPLETE EMPTYING: How often have you had the sensation of not emptying your bladder?: Not at all  FREQUENCY: How often do you have to urinate less than every two hours?: Not at all  INTERMITTENCY: How often have you found you stopped and started again several times when you urinated?: Not at all  URGENCY: How often have you found it difficult to postpone urination?: Not at all  WEAK STREAM: How often have you had a weak urinary stream?: Not at all  STRAINING: How often have you had to strain to start  urination?: Not at all  NOCTURIA: How many times did you typically get up at night to uriniate?: 2 Times  TOTAL I-PSS SCORE[de-identified] 2  How would you feel if you were to spend the rest of your life with your urinary condition?: Pleased    Last BUN and creatinine:  Lab Results   Component Value Date    BUN 15 05/18/2021     Lab Results   Component Value Date    CREATININE 0.93 05/18/2021       Additional Lab/Culture results: none    Imaging Reviewed during this Office Visit: none  (results were independently reviewed by physician and radiology report verified)    PAST MEDICAL, FAMILY AND SOCIAL HISTORY UPDATE:  Past Medical History:   Diagnosis Date    Heart attack ([facility] [address]     Hyperlipidemia     Hypertension     Seizures ([facility] [address] [facility]      Past Surgical History:   Procedure Laterality Date    CARDIAC CATHETERIZATION      COLONOSCOPY      polyps    CORONARY ANGIOPLASTY WITH STENT PLACEMENT      x2    HERNIA REPAIR      TONSILLECTOMY      TYMPANOSTOMY TUBE PLACEMENT      VENTRICULAR ABLATION SURGERY  3/23/16     Family History   Problem Relation Age of Onset    Stroke Mother     Cancer Father         bone and kidney     Heart Disease Father     Cancer Brother         bone    Heart Disease Brother      Outpatient Medications Marked as Taking for the 3/29/22 encounter (Office Visit) with Sanford Stoddard MD   Medication Sig Dispense Refill    cefPROZIL (CEFZIL) 500 MG tablet take 1 tablet by mouth every 12 hours for 10 days      metoprolol tartrate (LOPRESSOR) 50 MG tablet take 1 tablet by mouth once daily      ramipril (ALTACE) 5 MG capsule take 1 capsule by mouth once daily      rivaroxaban (XARELTO) 20 MG TABS tablet Take 20 mg by mouth Daily with supper       alfuzosin (UROXATRAL) 10 MG extended release tablet Take 1 tablet by mouth daily 90 tablet 3    aspirin 81 MG chewable tablet Take 81 mg by mouth      RA LORATADINE 10 MG tablet   0    EFFIENT 10 MG TABS       metoprolol (TOPROL-XL) 50 MG XL tablet Take 1 tablet by mouth daily      nitroGLYCERIN (NITRODUR) 0.2 MG/HR Place onto the skin as needed      NITROSTAT 0.4 MG SL tablet Take 1 tablet by mouth as needed  0    ramipril (ALTACE) 2.5 MG capsule Take 5 mg by mouth daily       RANEXA 1000 MG SR tablet Take 1 tablet by mouth 2 times daily      CRESTOR 10 MG tablet Take 1 tablet by mouth daily      alfuzosin (UROXATRAL) 10 MG extended release tablet Take 1 tablet by mouth daily 90 tablet 3       Patient has no known allergies. Social History     Tobacco Use   Smoking Status Current Every Day Smoker    Packs/day: 1.00   Smokeless Tobacco Never Used     (Ifpatient a smoker, smoking cessation counseling offered)    Social History     Substance and Sexual Activity   Alcohol Use Yes    Comment: occasional        REVIEW OF SYSTEMS:  Review of Systems    Physical Exam:      Vitals:    03/29/22 1121   BP: 132/76   Pulse: 68   Temp: 97.2 °F (36.2 °C)   SpO2: 98%     Body mass index is 25.82 kg/m². Patient is a 59 y.o. male in no acute distress and alert and oriented to person, place and time. Physical Exam  Constitutional: Patient in no acute distress. Neuro: Alert and oriented to person, place and time. Psych: Mood normal, affect normal  Lungs: Respiratory effort is normal  Cardiovascular: Warm & Pink  Abdomen: Soft, non-tender, non-distended with no CVA,  No flank tenderness,  Or hepatosplenomegaly   Lymphatics: No palpablelymphadenopathy. Bladder non-tender and not distended. Musculoskeletal: Normal gait and station      Assessment and Plan      1. Screening for prostate cancer    2. BPH with obstruction/lower urinary tract symptoms           Plan:          Doing well. Continue Uroxatral.   PSA normal.   CARON was normal last year. Return in about 1 year (around 3/29/2023) for Labs. Prescriptions Ordered:  Orders Placed This Encounter   Medications    alfuzosin (UROXATRAL) 10 MG extended release tablet     Sig: Take 1 tablet by mouth daily     Dispense:  90 tablet     Refill:  3     Orders Placed:  Orders Placed This Encounter   Procedures    PSA, Diagnostic     Standing Status:   Future     Standing Expiration Date:   3/29/2023           Maren Church MD    Agree with the ROS entered by the MA.

## 2023-03-01 ENCOUNTER — OFFICE VISIT (OUTPATIENT)
Age: 63
End: 2023-03-01
Payer: COMMERCIAL

## 2023-03-01 VITALS
WEIGHT: 168 LBS | HEIGHT: 66 IN | HEART RATE: 67 BPM | DIASTOLIC BLOOD PRESSURE: 78 MMHG | RESPIRATION RATE: 17 BRPM | BODY MASS INDEX: 27 KG/M2 | TEMPERATURE: 98 F | SYSTOLIC BLOOD PRESSURE: 127 MMHG | OXYGEN SATURATION: 99 %

## 2023-03-01 DIAGNOSIS — I70.212 ATHEROSCLEROSIS OF NATIVE ARTERIES OF EXTREMITIES WITH INTERMITTENT CLAUDICATION, LEFT LEG (HCC): ICD-10-CM

## 2023-03-01 DIAGNOSIS — I70.213 ATHEROSCLEROSIS OF NATIVE ARTERIES OF EXTREMITIES WITH INTERMITTENT CLAUDICATION, BILATERAL LEGS (HCC): Primary | ICD-10-CM

## 2023-03-01 PROCEDURE — 99213 OFFICE O/P EST LOW 20 MIN: CPT | Performed by: SURGERY

## 2023-03-01 NOTE — PROGRESS NOTES
[address] [place] [id number]    Afua Haynes  1960  62 y.o.male       Chief Complaint:  Chief Complaint   Patient presents with    Follow-up     Follow up for LT leg stent. History of present Illness:  Pt is here today for evaluation and treatment of peripheral arterial disease. This is a 75-year-old male who previously had a left leg intervention in 2021. He has not had any recent vascular testing and in 2022 his NAYANA was noted to be normal.  He denies any claudication but is scheduled to see an electrophysiologist for cardiac arrhythmia. On review of systems he denies any claudications, no open wounds, and no calf or leg pain. He has no edema. On exam he has easily palpable dorsalis pedis and posterior tibial pulses with robust hair growth bilaterally. I see no evidence of residual peripheral arterial disease    Past Medical History:  has a past medical history of Heart attack ([address] Hyperlipidemia, Hypertension, and Seizures ([address] Past Surgical History:   Past Surgical History:   Procedure Laterality Date    CARDIAC CATHETERIZATION      COLONOSCOPY      polyps    CORONARY ANGIOPLASTY WITH STENT PLACEMENT      x2    HERNIA REPAIR      TONSILLECTOMY      TYMPANOSTOMY TUBE PLACEMENT      VENTRICULAR ABLATION SURGERY  3/23/16       Social History:  reports that he has been smoking cigarettes. He has been smoking an average of 1 pack per day. He has never used smokeless tobacco. He reports current alcohol use. He reports that he does not use drugs. Family History: family history includes Cancer in his brother and father; Heart Disease in his brother and father; Stroke in his mother.     Review of Systems:   Constitutional:  negative for  fevers, chills, sweats, fatigue, and weight loss  HEENT:  negative for vision or hearing changes, Respiratory:  negative for shortness of breath, cough, or congestion  Cardiovascular:  negative for  chest pain, palpitations  Gastrointestinal:  negative for nausea, vomiting, diarrhea, constipation, abdominal pain  Genitourinary:  negative for frequency, dysuria  Integument/Breast:  negative for rash, skin lesions  Musculoskeletal:  negative for muscle aches or joint pain  Neurological:  negative for headaches, dizziness, lightheadedness, numbness, pain and tingling extremities  Behavior/Psych:  negative for depression and anxiety    Allergies: Patient has no known allergies.     Current Meds:  Current Outpatient Medications:     cefPROZIL (CEFZIL) 500 MG tablet, take 1 tablet by mouth every 12 hours for 10 days, Disp: , Rfl:     metoprolol tartrate (LOPRESSOR) 50 MG tablet, take 1 tablet by mouth once daily, Disp: , Rfl:     ramipril (ALTACE) 5 MG capsule, take 1 capsule by mouth once daily, Disp: , Rfl:     alfuzosin (UROXATRAL) 10 MG extended release tablet, Take 1 tablet by mouth daily, Disp: 90 tablet, Rfl: 3    rivaroxaban (XARELTO) 20 MG TABS tablet, Take 20 mg by mouth Daily with supper , Disp: , Rfl:     alfuzosin (UROXATRAL) 10 MG extended release tablet, Take 1 tablet by mouth daily, Disp: 90 tablet, Rfl: 3    aspirin 81 MG chewable tablet, Take 81 mg by mouth, Disp: , Rfl:     RA LORATADINE 10 MG tablet, , Disp: , Rfl: 0    EFFIENT 10 MG TABS, , Disp: , Rfl:     metoprolol (TOPROL-XL) 50 MG XL tablet, Take 1 tablet by mouth daily, Disp: , Rfl:     nitroGLYCERIN (NITRODUR) 0.2 MG/HR, Place onto the skin as needed, Disp: , Rfl:     NITROSTAT 0.4 MG SL tablet, Take 1 tablet by mouth as needed, Disp: , Rfl: 0    ramipril (ALTACE) 2.5 MG capsule, Take 5 mg by mouth daily , Disp: , Rfl:     RANEXA 1000 MG SR tablet, Take 1 tablet by mouth 2 times daily, Disp: , Rfl:     CRESTOR 10 MG tablet, Take 1 tablet by mouth daily, Disp: , Rfl:     Vital Signs:  Vitals:    03/01/23 1502   BP: 127/78   Pulse: 67 Resp: 17   Temp: 98 °F (36.7 °C)   SpO2: 99%       Physical Exam:  General appearance - alert, well appearing and in no acute distress  Mental status - oriented to person, place and time with normal affect  Head - normocephalic and atraumatic  Eyes - pupils equal and reactive, extraocular eye movements intact, conjunctiva clear  Ears - hearing appears to be intact  Nose - no drainage noted  Mouth - mucous membranes moist  Neck - supple, no carotid bruits, thyroid not palpable, no JVD  Chest - clear to auscultation, normal effort  Heart - normal rate, regular rhythm, no murmurs  Abdomen - soft, non-tender, non-distended, bowel sounds present all four quadrants, no masses, hepatomegaly, splenomegaly or aortic enlargement  Neurological - normal speech, no focal findings or movement disorder noted, cranial nerves II through XII grossly intact  Extremities - peripheral pulses palpable, no pedal edema or calf pain with palpation.   Good hair growth, no ulceration  Skin - no gross lesions, rashes, or induration noted    Labs:   Lab Results   Component Value Date/Time    WBC 10.3 05/18/2021 09:01 PM    HGB 14.4 05/18/2021 09:01 PM    HCT 42.8 05/18/2021 09:01 PM    MCV 98.4 05/18/2021 09:01 PM     05/18/2021 09:01 PM     Lab Results   Component Value Date/Time     05/18/2021 09:01 PM    K 4.5 05/18/2021 09:01 PM     05/18/2021 09:01 PM    CO2 20 05/18/2021 09:01 PM    BUN 15 05/18/2021 09:01 PM    CREATININE 0.93 05/18/2021 09:01 PM    GLUCOSE 94 05/18/2021 09:01 PM    CALCIUM 9.0 05/18/2021 09:01 PM     Lab Results   Component Value Date/Time    ALKPHOS 72 05/18/2021 09:01 PM    ALT 8 05/18/2021 09:01 PM    AST 16 05/18/2021 09:01 PM    PROT 6.7 05/18/2021 09:01 PM    BILITOT 0.20 05/18/2021 09:01 PM    LABALBU 4.0 05/18/2021 09:01 PM     No results found for: LACTA  No results found for: AMYLASE  No results found for: LIPASE  Lab Results   Component Value Date/Time    INR 1.0 05/18/2021 09:01 PM Assessment:  57 yr old male with PAD, previous RLE intervention. 1. Atherosclerosis of native arteries of extremities with intermittent claudication, bilateral legs (HCC)    2.  Atherosclerosis of native arteries of extremities with intermittent claudication, left leg (HCC)        Plan:   Repeat NAYANA in 1 year with follow up    Orders Placed This Encounter   Procedures    VL NAYANA BILATERAL LIMITED 1-2 LEVELS           Electronically signed by Merita Soulier, MD on 3/1/2023 at 3:13 PM     Copy sent to Dr. Holly Monroy MD

## 2023-03-17 ENCOUNTER — OFFICE VISIT (OUTPATIENT)
Dept: UROLOGY | Age: 63
End: 2023-03-17
Payer: COMMERCIAL

## 2023-03-17 VITALS
BODY MASS INDEX: 27 KG/M2 | DIASTOLIC BLOOD PRESSURE: 76 MMHG | SYSTOLIC BLOOD PRESSURE: 132 MMHG | WEIGHT: 168 LBS | RESPIRATION RATE: 16 BRPM | HEIGHT: 66 IN | HEART RATE: 80 BPM | TEMPERATURE: 97 F

## 2023-03-17 DIAGNOSIS — N13.8 BPH WITH OBSTRUCTION/LOWER URINARY TRACT SYMPTOMS: Primary | ICD-10-CM

## 2023-03-17 DIAGNOSIS — N40.1 BPH WITH OBSTRUCTION/LOWER URINARY TRACT SYMPTOMS: Primary | ICD-10-CM

## 2023-03-17 DIAGNOSIS — Z12.5 SCREENING FOR PROSTATE CANCER: ICD-10-CM

## 2023-03-17 PROCEDURE — 99214 OFFICE O/P EST MOD 30 MIN: CPT | Performed by: UROLOGY

## 2023-03-17 RX ORDER — ALFUZOSIN HYDROCHLORIDE 10 MG/1
10 TABLET, EXTENDED RELEASE ORAL DAILY
Qty: 90 TABLET | Refills: 3 | Status: SHIPPED | OUTPATIENT
Start: 2023-03-17

## 2023-03-17 ASSESSMENT — ENCOUNTER SYMPTOMS
BACK PAIN: 0
COUGH: 0
SHORTNESS OF BREATH: 0
VOMITING: 0
WHEEZING: 0
CONSTIPATION: 0
EYE PAIN: 0
NAUSEA: 0
DIARRHEA: 0
ABDOMINAL PAIN: 0

## 2023-03-17 NOTE — PROGRESS NOTES
[facility] PHYSICIANS [facility]OLOG[facility]  [address]  [facility] [address]  Dept: [phone]    [facility] Urology Office Note - Established    Patient:  Demarcus Figueroa  YOB: 1960  Date: 3/17/2023    The patient is a 62 y.o. male who presents todayfor evaluation of the following problems:   Chief Complaint   Patient presents with    1 Year Follow Up     PSA       HPI  Here in follow up for BPH.   He is on Alfuzosin.   Doing well.   PSA is 1.13.  Alli FHx of prostate cancer.       Summary of old records: N/A    Additional History: N/A    Procedures Today: N/A    Urinalysis today:  No results found for this visit on 03/17/23.  Last several PSA's:  No results found for: PSA  Last total testosterone:  No results found for: TESTOSTERONE    AUA Symptom Score (3/17/2023):  INCOMPLETE EMPTYING: How often have you had the sensation of not emptying your bladder?: Not at all  FREQUENCY: How often do you have to urinate less than every two hours?: Not at all  INTERMITTENCY: How often have you found you stopped and started again several times when you urinated?: Not at all  URGENCY: How often have you found it difficult to postpone urination?: Not at all  WEAK STREAM: How often have you had a weak urinary stream?: Not at all  STRAINING: How often have you had to strain to start  urination?: Not at all  NOCTURIA: How many times did you typically get up at night to uriniate?: 2 Times  TOTAL I-PSS SCORE:: 2       Last BUN and creatinine:  Lab Results   Component Value Date    BUN 15 05/18/2021     Lab Results   Component Value Date    CREATININE 0.93 05/18/2021       Additional Lab/Culture results: none    Imaging Reviewed during this Office Visit: none  (results were independently reviewed by physician and radiology report verified)    PAST MEDICAL, FAMILY AND SOCIAL HISTORY UPDATE:  Past Medical History:   Diagnosis Date    Heart attack (HCC)      Hyperlipidemia     Hypertension     Seizures ([facility] [address]      Past Surgical History:   Procedure Laterality Date    CARDIAC CATHETERIZATION      COLONOSCOPY      polyps    CORONARY ANGIOPLASTY WITH STENT PLACEMENT      x2    HERNIA REPAIR      TONSILLECTOMY      TYMPANOSTOMY TUBE PLACEMENT      VENTRICULAR ABLATION SURGERY  3/23/16     Family History   Problem Relation Age of Onset    Stroke Mother     Cancer Father         bone and kidney     Heart Disease Father     Cancer Brother         bone    Heart Disease Brother      Outpatient Medications Marked as Taking for the 3/17/23 encounter (Office Visit) with Taisha Pham MD   Medication Sig Dispense Refill    cefPROZIL (CEFZIL) 500 MG tablet take 1 tablet by mouth every 12 hours for 10 days      metoprolol tartrate (LOPRESSOR) 50 MG tablet take 1 tablet by mouth once daily      ramipril (ALTACE) 5 MG capsule take 1 capsule by mouth once daily      alfuzosin (UROXATRAL) 10 MG extended release tablet Take 1 tablet by mouth daily 90 tablet 3    rivaroxaban (XARELTO) 20 MG TABS tablet Take 20 mg by mouth Daily with supper       alfuzosin (UROXATRAL) 10 MG extended release tablet Take 1 tablet by mouth daily 90 tablet 3    aspirin 81 MG chewable tablet Take 81 mg by mouth      RA LORATADINE 10 MG tablet   0    EFFIENT 10 MG TABS       metoprolol (TOPROL-XL) 50 MG XL tablet Take 1 tablet by mouth daily      nitroGLYCERIN (NITRODUR) 0.2 MG/HR Place onto the skin as needed      NITROSTAT 0.4 MG SL tablet Take 1 tablet by mouth as needed  0    ramipril (ALTACE) 2.5 MG capsule Take 5 mg by mouth daily       RANEXA 1000 MG SR tablet Take 1 tablet by mouth 2 times daily      CRESTOR 10 MG tablet Take 1 tablet by mouth daily         Patient has no known allergies.   Social History     Tobacco Use   Smoking Status Every Day    Packs/day: 1.00    Types: Cigarettes   Smokeless Tobacco Never     (Ifpatient a smoker, smoking cessation counseling offered)    Social History     Substance and Sexual Activity   Alcohol Use Yes    Comment: occasional        REVIEW OF SYSTEMS:  Review of Systems    Physical Exam:      Vitals:    03/17/23 0908   BP: 132/76   Pulse: 80   Resp: 16   Temp: 97 °F (36.1 °C)     Body mass index is 27.12 kg/m². Patient is a 57 y.o. male in no acute distress and alert and oriented to person, place and time. Physical Exam  Constitutional: Patient in no acute distress. Neuro: Alert and oriented to person, place and time. Psych: Mood normal, affect normal  Prostate: deferred, was negative previously. Assessment and Plan      1. BPH with obstruction/lower urinary tract symptoms    2. Screening for prostate cancer           Plan:         Doing well. Continue Uroxatral.   F/U in 1 year with a PSA. Return in about 1 year (around 3/17/2024) for Labs. Prescriptions Ordered:  No orders of the defined types were placed in this encounter. Orders Placed:  Orders Placed This Encounter   Procedures    PSA, Diagnostic     Standing Status:   Future     Standing Expiration Date:   3/16/2024             Stephen Leger MD    Agree with the ROS entered by the MA.

## 2023-03-17 NOTE — LETTER
[address] [facility]  [address] [facility] [address]  Dept: [phone]  Dept Fax: [phone]        3/17/23    Patient: Brown Katz  YOB: 1960    Dear Demario Mitchell MD,    I had the pleasure of seeing one of your patients, Tan Fortune today in the office today. Below are the relevant portions of my assessment and plan of care. IMPRESSION:  1. BPH with obstruction/lower urinary tract symptoms    2. Screening for prostate cancer        PLAN:  Doing well. Continue Uroxatral.   F/U in 1 year with a PSA. Return in about 1 year (around 3/17/2024) for Labs. Prescriptions Ordered:  No orders of the defined types were placed in this encounter. Orders Placed:  Orders Placed This Encounter   Procedures    PSA, Diagnostic     Standing Status:   Future     Standing Expiration Date:   3/16/2024          Thank you for allowing me to participate in the care of this patient. I will keep you updated on this patient's follow up and I look forward to serving you and your patients again in the future.         Maite Ambrocio MD

## 2023-06-23 ENCOUNTER — OFFICE VISIT (OUTPATIENT)
Dept: PODIATRY | Age: 63
End: 2023-06-23
Payer: COMMERCIAL

## 2023-06-23 VITALS — HEIGHT: 66 IN | BODY MASS INDEX: 26.52 KG/M2 | WEIGHT: 165 LBS

## 2023-06-23 DIAGNOSIS — L60.0 INGROWN NAIL OF GREAT TOE OF RIGHT FOOT: Primary | ICD-10-CM

## 2023-06-23 DIAGNOSIS — M79.604 PAIN IN BOTH LOWER EXTREMITIES: ICD-10-CM

## 2023-06-23 DIAGNOSIS — M79.605 PAIN IN BOTH LOWER EXTREMITIES: ICD-10-CM

## 2023-06-23 PROCEDURE — 99203 OFFICE O/P NEW LOW 30 MIN: CPT | Performed by: PODIATRIST

## 2023-06-23 PROCEDURE — 1123F ACP DISCUSS/DSCN MKR DOCD: CPT | Performed by: PODIATRIST

## 2023-07-10 ENCOUNTER — PROCEDURE VISIT (OUTPATIENT)
Dept: PODIATRY | Age: 63
End: 2023-07-10
Payer: COMMERCIAL

## 2023-07-10 VITALS — WEIGHT: 165 LBS | HEIGHT: 66 IN | BODY MASS INDEX: 26.52 KG/M2

## 2023-07-10 DIAGNOSIS — M79.605 PAIN IN BOTH LOWER EXTREMITIES: ICD-10-CM

## 2023-07-10 DIAGNOSIS — L60.0 INGROWN NAIL OF GREAT TOE OF RIGHT FOOT: Primary | ICD-10-CM

## 2023-07-10 DIAGNOSIS — M79.604 PAIN IN BOTH LOWER EXTREMITIES: ICD-10-CM

## 2023-07-10 PROCEDURE — 1123F ACP DISCUSS/DSCN MKR DOCD: CPT | Performed by: PODIATRIST

## 2023-07-10 PROCEDURE — 11750 EXCISION NAIL&NAIL MATRIX: CPT | Performed by: PODIATRIST

## 2023-07-10 PROCEDURE — 99214 OFFICE O/P EST MOD 30 MIN: CPT | Performed by: PODIATRIST

## 2023-07-19 ENCOUNTER — OFFICE VISIT (OUTPATIENT)
Dept: PODIATRY | Age: 63
End: 2023-07-19

## 2023-07-19 VITALS — HEIGHT: 66 IN | BODY MASS INDEX: 26.52 KG/M2 | WEIGHT: 165 LBS

## 2023-07-19 DIAGNOSIS — Z98.890 POST-OPERATIVE STATE: Primary | ICD-10-CM

## 2023-07-19 PROCEDURE — 99024 POSTOP FOLLOW-UP VISIT: CPT | Performed by: PODIATRIST

## 2023-07-19 NOTE — PROGRESS NOTES
[address] [facility],[address] PODIATRY [address] [place] [address]  Dept: [phone]  Dept Fax: [phone]    POST-OP PROGRESS NOTE  Date of patient's visit: 7/19/2023  Patient's Name:  Rosamaria Prabhakar YOB: 1960            Patient Care Team:  Kimmie Ryan MD as PCP - Jose Angel Alves MD as Consulting Physician (Vascular Surgery)  Ulysses Farmer DPM as Physician (Podiatry)        Chief Complaint   Patient presents with    Post-Op Check     1 wk post op ingrown nail       Pt's primary care physician is Kimmie Ryan MD last seen 04/19/2023     Subjective: Rosamaria Prabhakar is a 70 y.o. male who presents to the office today 1week(s)  S/P right hallux P&A   Problem List Items Addressed This Visit    None  . Patient relates pain is Absent  and improved. Pain is rated 0 out of 10 and is described as none. Currently denies F/C/N/V. Physical Examination:  Minimal bleeding post operatively. Edema present. No erythema. No Pus. Operative correction is satisfactory. Assessment: Rosamaria Prabhakar is status post as above  Normal post operative course. Doing well        No diagnosis found. Plan:  Patient examined and evaluated. Current condition and treatment options discussed in detail. Advised pt to monitor daily for infection. Verbal and written instructions given to patient. Orders: No orders of the defined types were placed in this encounter. Contact office with any questions/problems/concerns. RTC in 2month(s).      Electronically signed by Ulysses Farmer DPM on 7/19/2023 at 9:07 AM  7/19/2023

## 2024-03-01 ENCOUNTER — HOSPITAL ENCOUNTER (OUTPATIENT)
Dept: VASCULAR LAB | Age: 64
End: 2024-03-01
Attending: SURGERY
Payer: COMMERCIAL

## 2024-03-01 DIAGNOSIS — I70.213 ATHEROSCLEROSIS OF NATIVE ARTERIES OF EXTREMITIES WITH INTERMITTENT CLAUDICATION, BILATERAL LEGS (HCC): ICD-10-CM

## 2024-03-01 PROCEDURE — 93922 UPR/L XTREMITY ART 2 LEVELS: CPT

## 2024-03-02 LAB
VAS LEFT ABI: 1.03
VAS LEFT ARM BP: 129 MMHG
VAS LEFT DORSALIS PEDIS BP: 122 MMHG
VAS LEFT PTA BP: 133 MMHG
VAS LEFT TBI: 0.74
VAS LEFT TOE PRESSURE: 96 MMHG
VAS RIGHT ABI: 1.02
VAS RIGHT ARM BP: 115 MMHG
VAS RIGHT DORSALIS PEDIS BP: 130 MMHG
VAS RIGHT PTA BP: 131 MMHG
VAS RIGHT TBI: 0.74
VAS RIGHT TOE PRESSURE: 96 MMHG

## 2024-03-02 PROCEDURE — 93922 UPR/L XTREMITY ART 2 LEVELS: CPT | Performed by: SURGERY

## 2024-03-13 ENCOUNTER — OFFICE VISIT (OUTPATIENT)
Age: 64
End: 2024-03-13
Payer: COMMERCIAL

## 2024-03-13 VITALS
DIASTOLIC BLOOD PRESSURE: 73 MMHG | HEART RATE: 71 BPM | RESPIRATION RATE: 18 BRPM | TEMPERATURE: 98 F | OXYGEN SATURATION: 98 % | HEIGHT: 66 IN | SYSTOLIC BLOOD PRESSURE: 134 MMHG | WEIGHT: 166 LBS | BODY MASS INDEX: 26.68 KG/M2

## 2024-03-13 DIAGNOSIS — I70.212 ATHEROSCLEROSIS OF NATIVE ARTERIES OF EXTREMITIES WITH INTERMITTENT CLAUDICATION, LEFT LEG (HCC): Primary | ICD-10-CM

## 2024-03-13 PROCEDURE — 99213 OFFICE O/P EST LOW 20 MIN: CPT | Performed by: SURGERY

## 2024-03-13 PROCEDURE — 1123F ACP DISCUSS/DSCN MKR DOCD: CPT | Performed by: SURGERY

## 2024-03-21 DIAGNOSIS — N13.8 BPH WITH OBSTRUCTION/LOWER URINARY TRACT SYMPTOMS: Primary | ICD-10-CM

## 2024-03-21 DIAGNOSIS — N40.1 BPH WITH OBSTRUCTION/LOWER URINARY TRACT SYMPTOMS: Primary | ICD-10-CM

## 2024-03-26 ENCOUNTER — OFFICE VISIT (OUTPATIENT)
Dept: UROLOGY | Age: 64
End: 2024-03-26
Payer: COMMERCIAL

## 2024-03-26 VITALS
SYSTOLIC BLOOD PRESSURE: 134 MMHG | DIASTOLIC BLOOD PRESSURE: 83 MMHG | OXYGEN SATURATION: 98 % | WEIGHT: 170 LBS | HEIGHT: 66 IN | TEMPERATURE: 98.3 F | HEART RATE: 60 BPM | BODY MASS INDEX: 27.32 KG/M2

## 2024-03-26 DIAGNOSIS — Z12.5 SCREENING FOR PROSTATE CANCER: ICD-10-CM

## 2024-03-26 DIAGNOSIS — N40.1 BPH WITH OBSTRUCTION/LOWER URINARY TRACT SYMPTOMS: Primary | ICD-10-CM

## 2024-03-26 DIAGNOSIS — N13.8 BPH WITH OBSTRUCTION/LOWER URINARY TRACT SYMPTOMS: Primary | ICD-10-CM

## 2024-03-26 PROCEDURE — 99214 OFFICE O/P EST MOD 30 MIN: CPT | Performed by: UROLOGY

## 2024-03-26 PROCEDURE — 1123F ACP DISCUSS/DSCN MKR DOCD: CPT | Performed by: UROLOGY

## 2024-03-26 RX ORDER — ALFUZOSIN HYDROCHLORIDE 10 MG/1
10 TABLET, EXTENDED RELEASE ORAL DAILY
Qty: 90 TABLET | Refills: 3 | Status: SHIPPED | OUTPATIENT
Start: 2024-03-26

## 2024-03-26 ASSESSMENT — ENCOUNTER SYMPTOMS
DIARRHEA: 0
ABDOMINAL PAIN: 0
CONSTIPATION: 0
WHEEZING: 0
VOMITING: 0
SHORTNESS OF BREATH: 0
BACK PAIN: 0
COUGH: 0
NAUSEA: 0
EYE REDNESS: 0
EYE PAIN: 0

## 2024-03-26 NOTE — PROGRESS NOTES
Review of Systems   Constitutional:  Negative for chills, fatigue and fever.   Eyes:  Negative for pain, redness and visual disturbance.   Respiratory:  Negative for cough, shortness of breath and wheezing.    Cardiovascular:  Negative for chest pain and leg swelling.   Gastrointestinal:  Negative for abdominal pain, constipation, diarrhea, nausea and vomiting.   Genitourinary:  Negative for difficulty urinating, dysuria, flank pain, frequency, hematuria, scrotal swelling, testicular pain and urgency.   Musculoskeletal:  Negative for back pain, joint swelling and myalgias.   Skin:  Negative for rash and wound.   Neurological:  Negative for dizziness, tremors, weakness and numbness.   Hematological:  Does not bruise/bleed easily.     
Tobacco Never     (Ifpatient a smoker, smoking cessation counseling offered)    Social History     Substance and Sexual Activity   Alcohol Use Yes    Comment: occasional        REVIEW OF SYSTEMS:  Review of Systems    Physical Exam:      Vitals:    03/26/24 0824   BP: 134/83   Pulse: 60   Temp: 98.3 °F (36.8 °C)   SpO2: 98%     Body mass index is 27.45 kg/m².  Patient is a 63 y.o. male in no acute distress and alert and oriented to person, place and time.  Physical Exam  Constitutional: Patient in no acute distress.  Neuro: Alert and oriented to person, place and time.  Psych: Mood normal, affect normal  Skin: No rash noted  HEENT: Head: Normocephalic and atraumatic  CARON deferred.     Assessment and Plan      1. BPH with obstruction/lower urinary tract symptoms    2. Screening for prostate cancer           Plan:         Doing well.   PSA is normal.   Alfuzosin helps with voiding.   He wants to continue for now.     Return in about 1 year (around 3/26/2025) for Labs.    Prescriptions Ordered:  Orders Placed This Encounter   Medications    alfuzosin (UROXATRAL) 10 MG extended release tablet     Sig: Take 1 tablet by mouth daily     Dispense:  90 tablet     Refill:  3     Orders Placed:  Orders Placed This Encounter   Procedures    PSA Screening     Standing Status:   Future     Standing Expiration Date:   3/26/2025           Ronaldo Taylor MD    Agree with the ROS entered by the MA.

## 2025-03-24 ENCOUNTER — TELEPHONE (OUTPATIENT)
Dept: UROLOGY | Age: 65
End: 2025-03-24

## 2025-03-24 NOTE — TELEPHONE ENCOUNTER
Writer called patient-left a message on his machine letting him know to complete PSA prior to appt.

## 2025-03-28 ENCOUNTER — OFFICE VISIT (OUTPATIENT)
Dept: UROLOGY | Age: 65
End: 2025-03-28
Payer: COMMERCIAL

## 2025-03-28 VITALS
HEART RATE: 68 BPM | WEIGHT: 170 LBS | DIASTOLIC BLOOD PRESSURE: 72 MMHG | SYSTOLIC BLOOD PRESSURE: 114 MMHG | HEIGHT: 66 IN | TEMPERATURE: 97.8 F | BODY MASS INDEX: 27.32 KG/M2 | OXYGEN SATURATION: 99 %

## 2025-03-28 DIAGNOSIS — N40.1 BPH WITH OBSTRUCTION/LOWER URINARY TRACT SYMPTOMS: Primary | ICD-10-CM

## 2025-03-28 DIAGNOSIS — N13.8 BPH WITH OBSTRUCTION/LOWER URINARY TRACT SYMPTOMS: Primary | ICD-10-CM

## 2025-03-28 DIAGNOSIS — Z12.5 SCREENING FOR PROSTATE CANCER: ICD-10-CM

## 2025-03-28 PROCEDURE — 1123F ACP DISCUSS/DSCN MKR DOCD: CPT | Performed by: UROLOGY

## 2025-03-28 PROCEDURE — 99214 OFFICE O/P EST MOD 30 MIN: CPT | Performed by: UROLOGY

## 2025-03-28 RX ORDER — ISOSORBIDE MONONITRATE 30 MG/1
30 TABLET, EXTENDED RELEASE ORAL DAILY
COMMUNITY
Start: 2024-07-02

## 2025-03-28 RX ORDER — VARENICLINE TARTRATE 1 MG/1
TABLET, FILM COATED ORAL
COMMUNITY
Start: 2024-06-03

## 2025-03-28 RX ORDER — ALFUZOSIN HYDROCHLORIDE 10 MG/1
10 TABLET, EXTENDED RELEASE ORAL DAILY
Qty: 90 TABLET | Refills: 3 | Status: SHIPPED | OUTPATIENT
Start: 2025-03-28

## 2025-03-28 RX ORDER — RAMIPRIL 10 MG/1
CAPSULE ORAL
COMMUNITY
Start: 2025-03-21

## 2025-03-28 ASSESSMENT — ENCOUNTER SYMPTOMS
COLOR CHANGE: 0
EYE REDNESS: 0
VOMITING: 0
ALLERGIC/IMMUNOLOGIC NEGATIVE: 1
SHORTNESS OF BREATH: 0
BACK PAIN: 0
COUGH: 0
NAUSEA: 0
RESPIRATORY NEGATIVE: 1
ABDOMINAL PAIN: 0
EYES NEGATIVE: 1
EYE PAIN: 0
GASTROINTESTINAL NEGATIVE: 1
WHEEZING: 0

## 2025-03-28 NOTE — PROGRESS NOTES
[facility] PHYSICIANS [facility] UROLOGY [facility]ENTER  [address]  [facility] [address]  Dept: [phone]    [facility] Urology Office Note - Established    Patient:  Demarcus Figueroa  YOB: 1960  Date: 3/28/2025    The patient is a 64 y.o. male who presents todayfor evaluation of the following problems:   Chief Complaint   Patient presents with    BPH with obstruction/lower urinary tract symptoms     1yr psa         HPI  He is here in follow up for BPH and prostate cancer screening.   He is doing well.   Uroxatral is working well for him.       Summary of old records: N/A    Additional History: N/A    Procedures Today: N/A    Urinalysis today:  No results found for this visit on 03/28/25.  Last several PSA's:  No results found for: \"PSA\"  Last total testosterone:  No results found for: \"TESTOSTERONE\"    AUA Symptom Score (3/28/2025):                               Last BUN and creatinine:  Lab Results   Component Value Date    BUN 15 05/18/2021     Lab Results   Component Value Date    CREATININE 0.93 05/18/2021       Additional Lab/Culture results: none    Imaging Reviewed during this Office Visit: none  (results were independently reviewed by physician and radiology report verified)    PAST MEDICAL, FAMILY AND SOCIAL HISTORY UPDATE:  Past Medical History:   Diagnosis Date    Heart attack (HCC)     Hyperlipidemia     Hypertension     Seizures (HCC)      Past Surgical History:   Procedure Laterality Date    CARDIAC CATHETERIZATION      COLONOSCOPY      polyps    CORONARY ANGIOPLASTY WITH STENT PLACEMENT      x2    HERNIA REPAIR      TONSILLECTOMY      TYMPANOSTOMY TUBE PLACEMENT      VENTRICULAR ABLATION SURGERY  3/23/16     Family History   Problem Relation Age of Onset    Stroke Mother     Cancer Father         bone and kidney     Heart Disease Father     Cancer Brother         bone    Heart Disease Brother      Outpatient Medications Marked as Taking for

## 2025-04-10 ENCOUNTER — PROCEDURE VISIT (OUTPATIENT)
Dept: PODIATRY | Age: 65
End: 2025-04-10
Payer: COMMERCIAL

## 2025-04-10 VITALS — HEIGHT: 65 IN | BODY MASS INDEX: 28.32 KG/M2 | WEIGHT: 170 LBS

## 2025-04-10 DIAGNOSIS — M79.604 PAIN IN BOTH LOWER EXTREMITIES: ICD-10-CM

## 2025-04-10 DIAGNOSIS — L60.0 INGROWN NAIL OF GREAT TOE OF RIGHT FOOT: Primary | ICD-10-CM

## 2025-04-10 DIAGNOSIS — M79.605 PAIN IN BOTH LOWER EXTREMITIES: ICD-10-CM

## 2025-04-10 PROCEDURE — 11750 EXCISION NAIL&NAIL MATRIX: CPT | Performed by: PODIATRIST

## 2025-04-10 PROCEDURE — 99214 OFFICE O/P EST MOD 30 MIN: CPT | Performed by: PODIATRIST

## 2025-04-10 PROCEDURE — 1123F ACP DISCUSS/DSCN MKR DOCD: CPT | Performed by: PODIATRIST

## 2025-04-10 NOTE — PROGRESS NOTES
[facility] PODIATRY  [address] [place], [place] [id number]  Dept: [phone]    INGROWN TOENAIL NOTE  Date of patient's visit: 4/10/2025  Patient's Name:  Demarcus Figueroa YOB: 1960            Patient Care Team:  Denis Martinez MD as PCP - General  Delos Reyes, Arthur, MD as Consulting Physician (Vascular Surgery)  Tawanna Hobbs DPM as Physician (Podiatry)          Chief Complaint   Patient presents with    Ingrown Toenail     Right great toe    Benign Neoplasm     Left foot                      Demarcus Figueroa 64 y.o. male that presents complaining of a painful infected ingrown toenail on the 1st Right toes. Conservative therapy has failed.    Symptoms began 6 month(s) ago.  Patient relates pain is present.  Pain is rated 3 out of 10 and is described as none.  Treatments prior to today's visit include: previous podiatry treatment.  Currently denies F/C/N/V.   Patient also has a callous on the bottom of his left foot that he would like trimmed.    No Known Allergies    Past Medical History:   Diagnosis Date    Heart attack (HCC)     Hyperlipidemia     Hypertension     Seizures (HCC)        Prior to Admission medications    Medication Sig Start Date End Date Taking? Authorizing Provider   isosorbide mononitrate (IMDUR) 30 MG extended release tablet Take 1 tablet by mouth daily 7/2/24   Marsha Hamlin MD   varenicline (CHANTIX) 1 MG tablet Take with full glass of water.take 1 tablet by mouth every morning and BEFORE BEDTIME WITH A FULL GLASS OF WATER 6/3/24   Marsha Hamlin MD   ramipril (ALTACE) 10 MG capsule TAKE ONE CAPSULE BY MOUTH EVERY AFTERNOON 3/21/25   Marsha Hamlin MD   alfuzosin (UROXATRAL) 10 MG extended release tablet Take 1 tablet by mouth daily 3/28/25   Ronaldo Taylor MD   alfuzosin (UROXATRAL) 10 MG extended release tablet Take 1 tablet by mouth daily 3/26/24   Ronaldo Taylor MD

## 2025-04-24 ENCOUNTER — OFFICE VISIT (OUTPATIENT)
Dept: PODIATRY | Age: 65
End: 2025-04-24

## 2025-04-24 VITALS — WEIGHT: 170 LBS | BODY MASS INDEX: 28.32 KG/M2 | HEIGHT: 65 IN

## 2025-04-24 DIAGNOSIS — Z98.890 POST-OPERATIVE STATE: Primary | ICD-10-CM

## 2025-04-24 PROCEDURE — 99024 POSTOP FOLLOW-UP VISIT: CPT | Performed by: PODIATRIST

## 2025-04-24 NOTE — PROGRESS NOTES
[facility] PHYSICIANS [facility] PODIATRY  [address] [place], [place] [address]  Dept: [phone]    POST-OP PROGRESS NOTE  Date of patient's visit: 4/24/2025  Patient's Name:  Demarcus Figueroa YOB: 1960            Patient Care Team:  Denis Martinez MD as PCP - General  Delos Reyes, Arthur, MD as Consulting Physician (Vascular Surgery)  Tawanna Hobbs DPM as Physician (Podiatry)        Chief Complaint   Patient presents with    Post-Op Check     Post op x 2 weeks       Pt's primary care physician is Denis Martinez MD last seen January 22 2024     Subjective: Demarcus Figueroa is a 64 y.o. male who presents to the office today 2week(s)  S/P chemical matrixectomy right great toe for correction of ingrown toenail  Problem List Items Addressed This Visit    None  Visit Diagnoses         Post-operative state    -  Primary        . Patient relates pain is Absent  and IMPROVED.  Pain is rated 0 out of 10 and is described as none. Currently denies F/C/N/V.  Is patient taking pain medications as prescribed and is controlling pain no    Physical Examination:  Incision is coapted, sutures/steri-strips are intact. Minimal bleeding post operatively. Edema present. No erythema. No Pus.   Operative correction is satisfactory.      Assessment: Demarcus Figueroa is status post chemical matrixectomy right great toe  Normal post operative course.  Doing well          ICD-10-CM    1. Post-operative state  Z98.890             Plan:  Patient examined and evaluated.  Current condition and treatment options discussed in detail.  Advised pt to monitor daily for increased signs of infection.  Verbal and written instructions given to patient.  Orders: No orders of the defined types were placed in this encounter.     Contact office with any questions/problems/concerns.  RTC in 3month(s).     Electronically signed by Tawanna Hobbs DPM on 4/24/2025 at 2:29 PM  4/24/2025